# Patient Record
Sex: FEMALE | Race: WHITE | NOT HISPANIC OR LATINO | URBAN - METROPOLITAN AREA
[De-identification: names, ages, dates, MRNs, and addresses within clinical notes are randomized per-mention and may not be internally consistent; named-entity substitution may affect disease eponyms.]

---

## 2017-04-21 ENCOUNTER — EMERGENCY (EMERGENCY)
Facility: HOSPITAL | Age: 27
LOS: 1 days | End: 2017-04-21
Attending: EMERGENCY MEDICINE | Admitting: EMERGENCY MEDICINE
Payer: COMMERCIAL

## 2017-04-21 VITALS
HEART RATE: 96 BPM | TEMPERATURE: 99 F | OXYGEN SATURATION: 100 % | RESPIRATION RATE: 18 BRPM | SYSTOLIC BLOOD PRESSURE: 141 MMHG | DIASTOLIC BLOOD PRESSURE: 97 MMHG

## 2017-04-21 VITALS
HEART RATE: 100 BPM | OXYGEN SATURATION: 99 % | WEIGHT: 195.11 LBS | TEMPERATURE: 98 F | HEIGHT: 62 IN | DIASTOLIC BLOOD PRESSURE: 89 MMHG | SYSTOLIC BLOOD PRESSURE: 139 MMHG | RESPIRATION RATE: 18 BRPM

## 2017-04-21 PROCEDURE — 99283 EMERGENCY DEPT VISIT LOW MDM: CPT | Mod: 25

## 2017-04-21 PROCEDURE — 73630 X-RAY EXAM OF FOOT: CPT | Mod: 26,RT

## 2017-04-21 PROCEDURE — 99283 EMERGENCY DEPT VISIT LOW MDM: CPT

## 2017-04-21 PROCEDURE — 73630 X-RAY EXAM OF FOOT: CPT

## 2017-04-21 RX ORDER — CITALOPRAM 10 MG/1
0 TABLET, FILM COATED ORAL
Qty: 0 | Refills: 0 | COMMUNITY

## 2017-04-21 NOTE — ED ADULT NURSE REASSESSMENT NOTE - NS ED NURSE REASSESS COMMENT FT1
1805 Reevaluated by MD after Xrays. Instructions given to pt, with good understanding. Pt discharged.

## 2017-04-21 NOTE — ED PROVIDER NOTE - CHPI ED SYMPTOMS NEG
no crying/no laceration/no dizziness/no headache/no disorientation/no loss of consciousness/no difficulty bearing weight
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
## 2019-10-15 NOTE — ED ADULT TRIAGE NOTE - BMI (KG/M2)
Guru Holden is a 25 y.o. female    Chief Complaint   Patient presents with    Routine Prenatal Visit     39 week       1. Have you been to the ER, urgent care clinic since your last visit? Hospitalized since your last visit? No  M  2. Have you seen or consulted any other health care providers outside of the [address] [place] [address] since your last visit? Include any pap smears or colon screening. No      Visit Vitals  /73 (BP 1 Location: Left arm, BP Patient Position: Sitting)   Pulse 100   Temp 98.1 °F (36.7 °C) (Oral)   Resp 18   Ht 5' 1.5\" (1.562 m)   Wt 171 lb (77.6 kg)   SpO2 99%   BMI 31.79 kg/m²           There are no preventive care reminders to display for this patient. Medication Reconciliation completed, changes noted.   Please  Update medication list. 35.7